# Patient Record
Sex: FEMALE | Race: WHITE | URBAN - METROPOLITAN AREA
[De-identification: names, ages, dates, MRNs, and addresses within clinical notes are randomized per-mention and may not be internally consistent; named-entity substitution may affect disease eponyms.]

---

## 2018-08-04 ENCOUNTER — NURSE TRIAGE (OUTPATIENT)
Dept: NURSING | Facility: CLINIC | Age: 43
End: 2018-08-04

## 2018-08-04 NOTE — TELEPHONE ENCOUNTER
Reason for Disposition    [1] Periods with > 6 soaked pads or tampons per day AND [2] last > 7 days    Additional Information    Negative: Shock suspected (e.g., cold/pale/clammy skin, too weak to stand, low BP, rapid pulse)    Negative: Difficult to awaken or acting confused  (e.g., disoriented, slurred speech)    Negative: Passed out (i.e., lost consciousness, collapsed and was not responding)    Negative: Sounds like a life-threatening emergency to the triager    Negative: Followed a genital area injury    Negative: Pregnant > 20 weeks  (5 months or more)    Negative: Pregnant < 20 weeks  (less than 5 months)    Negative: Bleeding occurring > 12 months after menopause    Negative: Bleeding from sexual abuse or rape    Negative: [1] Vaginal discharge is main symptom AND [2] small amount of blood    Negative: SEVERE abdominal pain    Negative: SEVERE dizziness (e.g., unable to stand, requires support to walk, feels like passing out now)    Negative: SEVERE vaginal bleeding (i.e., soaking 2 pads or tampons per hour and present 2 or more hours)    Negative: Patient sounds very sick or weak to the triager    Negative: MODERATE vaginal bleeding (i.e., soaking 1 pad or tampon per hour and present > 6 hours)    Negative: [1] Constant abdominal pain AND [2] present > 2 hours    Negative: Pale skin (pallor) of new onset or worsening    Negative: Passed tissue (e.g., gray-white)    Negative: Taking Coumadin (warfarin) or other strong blood thinner, or known bleeding disorder (e.g., thrombocytopenia)    Negative: [1] Skin bruises or nosebleed AND [2] not caused by an injury    Protocols used: VAGINAL BLEEDING - ABNORMAL-ADULT-    Carter calls and says that she wants to get an appointment to see Dr. Riley. Pt. Says that she just got her period again, after having her period-2 weeks ago. Pt. Says that she always has bad periods, too. RN then called  , to call pt. Back, at: [phone], for assistance in  scheduling the appointment.

## 2018-08-07 ENCOUNTER — RADIANT APPOINTMENT (OUTPATIENT)
Dept: ULTRASOUND IMAGING | Facility: CLINIC | Age: 43
End: 2018-08-07
Attending: OBSTETRICS & GYNECOLOGY
Payer: COMMERCIAL

## 2018-08-07 ENCOUNTER — OFFICE VISIT (OUTPATIENT)
Dept: OBGYN | Facility: CLINIC | Age: 43
End: 2018-08-07
Payer: COMMERCIAL

## 2018-08-07 VITALS
HEART RATE: 66 BPM | HEIGHT: 65 IN | WEIGHT: 181 LBS | BODY MASS INDEX: 30.16 KG/M2 | SYSTOLIC BLOOD PRESSURE: 124 MMHG | DIASTOLIC BLOOD PRESSURE: 72 MMHG

## 2018-08-07 DIAGNOSIS — N80.03 ADENOMYOSIS: ICD-10-CM

## 2018-08-07 DIAGNOSIS — N92.0 MENORRHAGIA WITH REGULAR CYCLE: Primary | ICD-10-CM

## 2018-08-07 DIAGNOSIS — N92.0 MENORRHAGIA WITH REGULAR CYCLE: ICD-10-CM

## 2018-08-07 PROCEDURE — 99204 OFFICE O/P NEW MOD 45 MIN: CPT | Performed by: OBSTETRICS & GYNECOLOGY

## 2018-08-07 RX ORDER — SPIRONOLACTONE 100 MG/1
100 TABLET, FILM COATED ORAL AT BEDTIME
COMMUNITY
Start: 2018-06-01

## 2018-08-07 RX ORDER — ACYCLOVIR 400 MG/1
400 TABLET ORAL DAILY PRN
COMMUNITY
Start: 2018-06-01

## 2018-08-07 RX ORDER — NORGESTIMATE AND ETHINYL ESTRADIOL 0.25-0.035
1 KIT ORAL AT BEDTIME
COMMUNITY

## 2018-08-07 ASSESSMENT — ANXIETY QUESTIONNAIRES
GAD7 TOTAL SCORE: 3
6. BECOMING EASILY ANNOYED OR IRRITABLE: SEVERAL DAYS
3. WORRYING TOO MUCH ABOUT DIFFERENT THINGS: SEVERAL DAYS
2. NOT BEING ABLE TO STOP OR CONTROL WORRYING: NOT AT ALL
IF YOU CHECKED OFF ANY PROBLEMS ON THIS QUESTIONNAIRE, HOW DIFFICULT HAVE THESE PROBLEMS MADE IT FOR YOU TO DO YOUR WORK, TAKE CARE OF THINGS AT HOME, OR GET ALONG WITH OTHER PEOPLE: SOMEWHAT DIFFICULT
1. FEELING NERVOUS, ANXIOUS, OR ON EDGE: NOT AT ALL
7. FEELING AFRAID AS IF SOMETHING AWFUL MIGHT HAPPEN: NOT AT ALL
5. BEING SO RESTLESS THAT IT IS HARD TO SIT STILL: NOT AT ALL

## 2018-08-07 ASSESSMENT — PATIENT HEALTH QUESTIONNAIRE - PHQ9: 5. POOR APPETITE OR OVEREATING: SEVERAL DAYS

## 2018-08-07 NOTE — NURSING NOTE
Please abstract the following data from this visit with this patient into the appropriate field in Epic:    Pap smear & HPV done on this date: 12/13/16 (approximately), by this group: Fran, results were wnl, HPV-.

## 2018-08-07 NOTE — MR AVS SNAPSHOT
"              After Visit Summary   2018    Carter Stacy    MRN: [id number]           Patient Information     Date Of Birth          1975        Visit Information        Provider Department      2018 11:15 AM Sam Riley MD [facility]        Today's Diagnoses     Menorrhagia with regular cycle    -  1    Adenomyosis           Follow-ups after your visit        Who to contact     If you have questions or need follow up information about today's clinic visit or your schedule please contact [facility] directly at [phone].  Normal or non-critical lab and imaging results will be communicated to you by [employer], letter or phone within 4 business days after the clinic has received the results. If you do not hear from us within 7 days, please contact the clinic through [employer] or phone. If you have a critical or abnormal lab result, we will notify you by phone as soon as possible.  Submit refill requests through [facility] or call your pharmacy and they will forward the refill request to us. Please allow 3 business days for your refill to be completed.          Additional Information About Your Visit        MyChart Information     [facility] lets you send messages to your doctor, view your test results, renew your prescriptions, schedule appointments and more. To sign up, go to [email or web address] . Click on \"Log in\" on the left side of the screen, which will take you to the Welcome page. Then click on \"Sign up Now\" on the right side of the page.     You will be asked to enter the access code listed below, as well as some personal information. Please follow the directions to create your username and password.     Your access code is: [email or web address]  Expires: 2018 11:09 AM     Your access code will  in 90 days. If you need help or a new code, please call your [facility] or [phone].        Care EveryWhere ID     This is your Care EveryWhere ID. " "This could be used by other organizations to access your [facility] medical records  [id number]        Your Vitals Were     Pulse Height Last Period BMI (Body Mass Index)          66 5' 5\" (1.651 m) 08/03/2018 (Exact Date) 30.12 kg/m2         Blood Pressure from Last 3 Encounters:   08/07/18 124/72    Weight from Last 3 Encounters:   08/07/18 181 lb (82.1 kg)               Primary Care Provider Office Phone # Fax #    [facility] [phone]       [facility] [address] [facility] [address]  [facility] [address]        Equal Access to Services     CYNTHIA CONN : Hadii travis michel Sobahman, waaxda luqadaha, qaybta kaalmada adeelisayada, nhi diaz. So [facility] [phone].    ATENCIÓN: Si habla español, tiene a butcher disposición servicios gratuitos de asistencia lingüística. Llame al [phone].    We comply with applicable federal civil rights laws and [place] laws. We do not discriminate on the basis of race, color, national origin, age, disability, sex, sexual orientation, or gender identity.            Thank you!     Thank you for choosing [facility]  for your care. Our goal is always to provide you with excellent care. Hearing back from our patients is one way we can continue to improve our services. Please take a few minutes to complete the written survey that you may receive in the mail after your visit with us. Thank you!             Your Updated Medication List - Protect others around you: Learn how to safely use, store and throw away your medicines at www.disposemymeds.org.          This list is accurate as of 8/7/18  1:16 PM.  Always use your most recent med list.                   Brand Name Dispense Instructions for use Diagnosis    acyclovir 400 MG tablet    ZOVIRAX          norgestimate-ethinyl estradiol 0.25-35 MG-MCG per tablet    ORTHO-CYCLEN, SPRINTEC          spironolactone 100 MG tablet    " ALDACTONE

## 2018-08-07 NOTE — PROGRESS NOTES
SUBJECTIVE:                                                   Carter Stacy is a 42 year old female who presents to clinic today for the following health issue(s):  Patient presents with:  Abnormal Bleeding Problem: c/o heavy periods      HPI: The patient is a 42-year-old  2 who is seen at this time for evaluation of progressive dysmenorrhea and menorrhagia.  She had 2 normal vaginal deliveries and has had multiple attempts at suppression of heavy bleeding with birth control pills.  She has had one abnormal Pap smear with a colposcopy that was unremarkable.  She has not had any recent ultrasound to look into why she is bleeding.      Patient's last menstrual period was 2018 (exact date)..   Patient is sexually active, No obstetric history on file..  Using oral contraceptives for contraception.    reports that she has quit smoking. She has never used smokeless tobacco.    STD testing offered?  Declined    Health maintenance updated:  yes    Today's PHQ-9 Score:   PHQ-9 SCORE 2018   Total Score 7     Today's TOBY-7 Score:   TOBY-7 SCORE 2018   Total Score 3       Problem list and histories reviewed & adjusted, as indicated.  Additional history: as documented.    There is no problem list on file for this patient.    Past Surgical History:   Procedure Laterality Date     ------------OTHER-------------      Exploratory for endo      Social History   Substance Use Topics     Smoking status: Former Smoker     Smokeless tobacco: Never Used     Alcohol use Yes      Problem (# of Occurrences) Relation (Name,Age of Onset)    Colon Cancer (1) Paternal Uncle    Diabetes (2) Maternal Uncle, Maternal Uncle    Hyperlipidemia (1) Father (45)    Hypertension (1) Father (45)            Current Outpatient Prescriptions   Medication Sig     acyclovir (ZOVIRAX) 400 MG tablet      norgestimate-ethinyl estradiol (ORTHO-CYCLEN, SPRINTEC) 0.25-35 MG-MCG per tablet      spironolactone (ALDACTONE) 100 MG tablet   "    No current facility-administered medications for this visit.      Allergies   Allergen Reactions     Sertraline Rash       ROS:  12 point review of systems negative other than symptoms noted below.  Constitutional: Fatigue and Weight Gain  Gastrointestinal: Bloating  Genitourinary: Cramps, Frequency, Heavy Bleeding with Period, Irregular Menses, Significant PMS and Spotting  Skin: Acne  Neurologic: Headaches  Endocrine: Excess Hair Growth  Psychiatric: Difficulty Sleeping and Corral    OBJECTIVE:     /72  Pulse 66  Ht 5' 5\" (1.651 m)  Wt 181 lb (82.1 kg)  LMP 08/03/2018 (Exact Date)  BMI 30.12 kg/m2  Body mass index is 30.12 kg/(m^2).    Exam:  Constitutional:  Appearance: Well nourished, well developed alert, in no acute distress  Neck:  Lymph Nodes:  No lymphadenopathy present; Thyroid:  Gland size normal, nontender, no nodules or masses present on palpation  Chest:  Respiratory Effort:  Breathing unlabored  Cardiovascular: Heart: Auscultation:  Regular rate, normal rhythm, no murmurs present  Gastrointestinal:  Abdominal Examination:  Abdomen nontender to palpation, tone normal without rigidity or guarding, no masses present, umbilicus without lesions; Liver/Spleen:  No hepatomegaly present, liver nontender to palpation; Hernias:  No hernias present  Lymphatic: Lymph Nodes:  No other lymphadenopathy present  Skin:General Inspection:  No rashes present, no lesions present, no areas of discoloration; Genitalia and Groin:  No rashes present, no lesions present, no areas of discoloration, no masses present.  Neurologic/Psychiatric:  Mental Status:  Oriented X3   Pelvic Exam:  External Genitalia:     Normal appearance for age, no discharge present, no tenderness present, no inflammatory lesions present, color normal  Vagina:     Normal vaginal vault without central or paravaginal defects, no discharge present, no inflammatory lesions present, no masses present  Bladder:     Nontender to " palpation  Urethra:   Urethral Body:  Urethra palpation normal, urethra structural support normal   Urethral Meatus:  No erythema or lesions present  Cervix:     Appearance healthy, no lesions present, nontender to palpation, no bleeding present  Uterus:     Uterus: firm, normal sized and nontender, midplane in position.   Adnexa:     No adnexal tenderness present, no adnexal masses present  Perineum:     Perineum within normal limits, no evidence of trauma, no rashes or skin lesions present  Anus:     Anus within normal limits, no hemorrhoids present  Inguinal Lymph Nodes:     No lymphadenopathy present  Pubic Hair:     Normal pubic hair distribution for age  Genitalia and Groin:     No rashes present, no lesions present, no areas of discoloration, no masses present       In-Clinic Test Results: Ultrasound today shows adenomyosis.       ASSESSMENT/PLAN:                                                          42-year-old patient with progressive dysmenorrhea and menorrhagia.  Her plate is complicated by cyclic headaches that she believes are hormonal.  She is on birth control pills at this time.  Her exam shows a boggy tender uterus and ultrasound shows adenomyosis.  The patient is given the option of hysteroscopy D&C and ablation versus laparoscopic assisted supracervical hysterectomy.  The risks and complications of both as well as success and failure and side effects were discussed and compared.  The patient will decide and schedule at her own disposition.        Sam Riley MD  [facility] [employer]

## 2018-08-08 ASSESSMENT — PATIENT HEALTH QUESTIONNAIRE - PHQ9: SUM OF ALL RESPONSES TO PHQ QUESTIONS 1-9: 7

## 2018-08-08 ASSESSMENT — ANXIETY QUESTIONNAIRES: GAD7 TOTAL SCORE: 3

## 2018-08-09 ENCOUNTER — TELEPHONE (OUTPATIENT)
Dept: OBGYN | Facility: CLINIC | Age: 43
End: 2018-08-09

## 2018-08-09 DIAGNOSIS — N80.03 ADENOMYOSIS: Primary | ICD-10-CM

## 2018-08-09 NOTE — TELEPHONE ENCOUNTER
Order Questions      Question Answer Comment     Procedure name(s) - multi select Laparoscopic-assisted supracervical hysterectomy, bilateral salpingectomy      Is this a multi surgeon case? No      Laterality N/A      Reason for procedure Adenomyosis      Location of Case: [facility] OR      Surgeon Procedure Time (incision to closure) in minutes (per procedure as applicable) 60      Note:  Surgical Case Time Needed (in minutes)     Patient Class (for admit prior to surgery, specify number of days in comments): Same day (hospital outpatient)      Why can t this outpatient surgery be done at the [facility] or [facility]? -      Anesthesia General      Vendor Needed? No

## 2018-08-09 NOTE — LETTER
August 16, 2018    Carter Stacy                                                                                                                     [address]  [facility] [address]    Dear Carter,    We have made effort to obtain an accurate quote from your insurance company based on the information we have on file. Your actual coverage may differ. [facility] can NOT guarantee coverage. We recommend that if you have questions regarding coverage to call your insurance company. Our billing department is happy to assist you with your insurance claim but you are responsible for all services rendered whether or not they are paid by your insurance provider. Again, this is not a guarantee of benefits just a notice of the information they have given to us.       Insurance Co [employer] Phone # [phone]  ID [id number]         Group [id number]  Gay roach/ Roxana on 8/16/2018    Policy Eff Date 1/1/2016 and current Yes  CoInsurance (covered at) 80% after deductible has been met and 100% after MOOP is met  Deductible $5700 met to date $3201.43  Max Out Of Pocket (MOOP) $9500 met to date $3205.90  CoPay $NA  Prior Auth Required:  No  Pre Existing Condition No  Surgeon E Rosemary In Network Yes  Hospital [facility] In Network Yes  Referral Needed:  No.  Mailed to Patient Yes                  If No Document why by date                  If Yes                                   Date 8/16/2018      Sincerely,         Gay Ling  Surgery Scheduler

## 2018-08-09 NOTE — TELEPHONE ENCOUNTER
Type of surgery: LASH BS  Location of surgery: [facility] OR  Date and time of surgery: 9/4/2018 7:20am ARRIVAL 5:30am  Surgeon: Rosemary  Pre-Op Appt Date: 8/7/2018  Post-Op Appt Date: TBD   Packet sent out: MAILED 8/9/2018MAILED 8/9/2018  Pre-cert/Authorization completed:  TBD  Date: 8/9/2018 Madan roach/Cecy Ling  Surgery Scheduler

## 2018-08-16 NOTE — TELEPHONE ENCOUNTER
Insurance Co [employer] Phone # [phone]  ID [id number]         Group [id number]  Gay garay w/ Roxana on 8/16/2018    Policy Eff Date 1/1/2016 and current Yes  CoInsurance (covered at) 80% after deductible has been met and 100% after MOOP is met  Deductible $5700 met to date $3201.43  Max Out Of Pocket (MOOP) $9500 met to date $3205.90  CoPay $NA  Prior Auth Required:  No  Pre Existing Condition No  Surgeon E Rosemary In Network Yes  Hospital [facility] In Network Yes  Referral Needed:  No.  Mailed to Patient Yes                  If No Document why by date                  If Yes                                   Date 8/16/2018    Gay Ling  Surgery Scheduler

## 2018-08-29 NOTE — H&P (VIEW-ONLY)
SUBJECTIVE:                                                   Carter Stacy is a 42 year old female who presents to clinic today for the following health issue(s):  Patient presents with:  Abnormal Bleeding Problem: c/o heavy periods      HPI: The patient is a 42-year-old  2 who is seen at this time for evaluation of progressive dysmenorrhea and menorrhagia.  She had 2 normal vaginal deliveries and has had multiple attempts at suppression of heavy bleeding with birth control pills.  She has had one abnormal Pap smear with a colposcopy that was unremarkable.  She has not had any recent ultrasound to look into why she is bleeding.      Patient's last menstrual period was 2018 (exact date)..   Patient is sexually active, No obstetric history on file..  Using oral contraceptives for contraception.    reports that she has quit smoking. She has never used smokeless tobacco.    STD testing offered?  Declined    Health maintenance updated:  yes    Today's PHQ-9 Score:   PHQ-9 SCORE 2018   Total Score 7     Today's TOBY-7 Score:   TOBY-7 SCORE 2018   Total Score 3       Problem list and histories reviewed & adjusted, as indicated.  Additional history: as documented.    There is no problem list on file for this patient.    Past Surgical History:   Procedure Laterality Date     ------------OTHER-------------      Exploratory for endo      Social History   Substance Use Topics     Smoking status: Former Smoker     Smokeless tobacco: Never Used     Alcohol use Yes      Problem (# of Occurrences) Relation (Name,Age of Onset)    Colon Cancer (1) Paternal Uncle    Diabetes (2) Maternal Uncle, Maternal Uncle    Hyperlipidemia (1) Father (45)    Hypertension (1) Father (45)            Current Outpatient Prescriptions   Medication Sig     acyclovir (ZOVIRAX) 400 MG tablet      norgestimate-ethinyl estradiol (ORTHO-CYCLEN, SPRINTEC) 0.25-35 MG-MCG per tablet      spironolactone (ALDACTONE) 100 MG tablet   "    No current facility-administered medications for this visit.      Allergies   Allergen Reactions     Sertraline Rash       ROS:  12 point review of systems negative other than symptoms noted below.  Constitutional: Fatigue and Weight Gain  Gastrointestinal: Bloating  Genitourinary: Cramps, Frequency, Heavy Bleeding with Period, Irregular Menses, Significant PMS and Spotting  Skin: Acne  Neurologic: Headaches  Endocrine: Excess Hair Growth  Psychiatric: Difficulty Sleeping and Corral    OBJECTIVE:     /72  Pulse 66  Ht 5' 5\" (1.651 m)  Wt 181 lb (82.1 kg)  LMP 08/03/2018 (Exact Date)  BMI 30.12 kg/m2  Body mass index is 30.12 kg/(m^2).    Exam:  Constitutional:  Appearance: Well nourished, well developed alert, in no acute distress  Neck:  Lymph Nodes:  No lymphadenopathy present; Thyroid:  Gland size normal, nontender, no nodules or masses present on palpation  Chest:  Respiratory Effort:  Breathing unlabored  Cardiovascular: Heart: Auscultation:  Regular rate, normal rhythm, no murmurs present  Gastrointestinal:  Abdominal Examination:  Abdomen nontender to palpation, tone normal without rigidity or guarding, no masses present, umbilicus without lesions; Liver/Spleen:  No hepatomegaly present, liver nontender to palpation; Hernias:  No hernias present  Lymphatic: Lymph Nodes:  No other lymphadenopathy present  Skin:General Inspection:  No rashes present, no lesions present, no areas of discoloration; Genitalia and Groin:  No rashes present, no lesions present, no areas of discoloration, no masses present.  Neurologic/Psychiatric:  Mental Status:  Oriented X3   Pelvic Exam:  External Genitalia:     Normal appearance for age, no discharge present, no tenderness present, no inflammatory lesions present, color normal  Vagina:     Normal vaginal vault without central or paravaginal defects, no discharge present, no inflammatory lesions present, no masses present  Bladder:     Nontender to " palpation  Urethra:   Urethral Body:  Urethra palpation normal, urethra structural support normal   Urethral Meatus:  No erythema or lesions present  Cervix:     Appearance healthy, no lesions present, nontender to palpation, no bleeding present  Uterus:     Uterus: firm, normal sized and nontender, midplane in position.   Adnexa:     No adnexal tenderness present, no adnexal masses present  Perineum:     Perineum within normal limits, no evidence of trauma, no rashes or skin lesions present  Anus:     Anus within normal limits, no hemorrhoids present  Inguinal Lymph Nodes:     No lymphadenopathy present  Pubic Hair:     Normal pubic hair distribution for age  Genitalia and Groin:     No rashes present, no lesions present, no areas of discoloration, no masses present       In-Clinic Test Results: Ultrasound today shows adenomyosis.       ASSESSMENT/PLAN:                                                          42-year-old patient with progressive dysmenorrhea and menorrhagia.  Her plate is complicated by cyclic headaches that she believes are hormonal.  She is on birth control pills at this time.  Her exam shows a boggy tender uterus and ultrasound shows adenomyosis.  The patient is given the option of hysteroscopy D&C and ablation versus laparoscopic assisted supracervical hysterectomy.  The risks and complications of both as well as success and failure and side effects were discussed and compared.  The patient will decide and schedule at her own disposition.        Sam Riley MD  [facility] [place]

## 2018-09-03 ENCOUNTER — ANESTHESIA EVENT (OUTPATIENT)
Dept: SURGERY | Facility: CLINIC | Age: 43
End: 2018-09-03
Payer: COMMERCIAL

## 2018-09-04 ENCOUNTER — SURGERY (OUTPATIENT)
Age: 43
End: 2018-09-04
Payer: COMMERCIAL

## 2018-09-04 ENCOUNTER — HOSPITAL ENCOUNTER (OUTPATIENT)
Facility: CLINIC | Age: 43
Discharge: HOME | End: 2018-09-05
Attending: OBSTETRICS & GYNECOLOGY | Admitting: OBSTETRICS & GYNECOLOGY
Payer: COMMERCIAL

## 2018-09-04 ENCOUNTER — ANESTHESIA (OUTPATIENT)
Dept: SURGERY | Facility: CLINIC | Age: 43
End: 2018-09-04
Payer: COMMERCIAL

## 2018-09-04 DIAGNOSIS — N80.03 ADENOMYOSIS: Primary | ICD-10-CM

## 2018-09-04 LAB — B-HCG SERPL-ACNC: <1 IU/L (ref 0–5)

## 2018-09-04 PROCEDURE — 40000936 ZZH STATISTIC OUTPATIENT (NON-OBS) NIGHT

## 2018-09-04 PROCEDURE — 27210794 ZZH OR GENERAL SUPPLY STERILE: Performed by: OBSTETRICS & GYNECOLOGY

## 2018-09-04 PROCEDURE — 84702 CHORIONIC GONADOTROPIN TEST: CPT | Performed by: OBSTETRICS & GYNECOLOGY

## 2018-09-04 PROCEDURE — 36415 COLL VENOUS BLD VENIPUNCTURE: CPT | Performed by: OBSTETRICS & GYNECOLOGY

## 2018-09-04 PROCEDURE — 25000125 ZZHC RX 250: Performed by: OBSTETRICS & GYNECOLOGY

## 2018-09-04 PROCEDURE — 25800025 ZZH RX 258: Performed by: OBSTETRICS & GYNECOLOGY

## 2018-09-04 PROCEDURE — 25000566 ZZH SEVOFLURANE, EA 15 MIN: Performed by: OBSTETRICS & GYNECOLOGY

## 2018-09-04 PROCEDURE — 88307 TISSUE EXAM BY PATHOLOGIST: CPT | Mod: 26 | Performed by: OBSTETRICS & GYNECOLOGY

## 2018-09-04 PROCEDURE — 36000093 ZZH SURGERY LEVEL 4 1ST 30 MIN: Performed by: OBSTETRICS & GYNECOLOGY

## 2018-09-04 PROCEDURE — 71000012 ZZH RECOVERY PHASE 1 LEVEL 1 FIRST HR: Performed by: OBSTETRICS & GYNECOLOGY

## 2018-09-04 PROCEDURE — 25000125 ZZHC RX 250: Performed by: NURSE ANESTHETIST, CERTIFIED REGISTERED

## 2018-09-04 PROCEDURE — 58542 LSH W/T/O UT 250 G OR LESS: CPT | Performed by: OBSTETRICS & GYNECOLOGY

## 2018-09-04 PROCEDURE — 25000128 H RX IP 250 OP 636: Performed by: NURSE ANESTHETIST, CERTIFIED REGISTERED

## 2018-09-04 PROCEDURE — 25000128 H RX IP 250 OP 636: Performed by: OBSTETRICS & GYNECOLOGY

## 2018-09-04 PROCEDURE — 40000170 ZZH STATISTIC PRE-PROCEDURE ASSESSMENT II: Performed by: OBSTETRICS & GYNECOLOGY

## 2018-09-04 PROCEDURE — 37000009 ZZH ANESTHESIA TECHNICAL FEE, EACH ADDTL 15 MIN: Performed by: OBSTETRICS & GYNECOLOGY

## 2018-09-04 PROCEDURE — 25000125 ZZHC RX 250: Performed by: ANESTHESIOLOGY

## 2018-09-04 PROCEDURE — 36000063 ZZH SURGERY LEVEL 4 EA 15 ADDTL MIN: Performed by: OBSTETRICS & GYNECOLOGY

## 2018-09-04 PROCEDURE — 40000934 ZZH STATISTIC OUTPATIENT (NON-OBS) DAY

## 2018-09-04 PROCEDURE — 37000008 ZZH ANESTHESIA TECHNICAL FEE, 1ST 30 MIN: Performed by: OBSTETRICS & GYNECOLOGY

## 2018-09-04 PROCEDURE — 88307 TISSUE EXAM BY PATHOLOGIST: CPT | Performed by: OBSTETRICS & GYNECOLOGY

## 2018-09-04 PROCEDURE — 25000132 ZZH RX MED GY IP 250 OP 250 PS 637: Performed by: OBSTETRICS & GYNECOLOGY

## 2018-09-04 PROCEDURE — 25000128 H RX IP 250 OP 636: Performed by: ANESTHESIOLOGY

## 2018-09-04 PROCEDURE — 40000935 ZZH STATISTIC OUTPATIENT (NON-OBS) EVE

## 2018-09-04 RX ORDER — PROPOFOL 10 MG/ML
INJECTION, EMULSION INTRAVENOUS CONTINUOUS PRN
Status: DISCONTINUED | OUTPATIENT
Start: 2018-09-04 | End: 2018-09-04

## 2018-09-04 RX ORDER — MEPERIDINE HYDROCHLORIDE 25 MG/ML
12.5 INJECTION INTRAMUSCULAR; INTRAVENOUS; SUBCUTANEOUS
Status: DISCONTINUED | OUTPATIENT
Start: 2018-09-04 | End: 2018-09-04

## 2018-09-04 RX ORDER — ONDANSETRON 2 MG/ML
4 INJECTION INTRAMUSCULAR; INTRAVENOUS EVERY 30 MIN PRN
Status: DISCONTINUED | OUTPATIENT
Start: 2018-09-04 | End: 2018-09-04

## 2018-09-04 RX ORDER — SODIUM CHLORIDE, SODIUM LACTATE, POTASSIUM CHLORIDE, CALCIUM CHLORIDE 600; 310; 30; 20 MG/100ML; MG/100ML; MG/100ML; MG/100ML
INJECTION, SOLUTION INTRAVENOUS CONTINUOUS
Status: DISCONTINUED | OUTPATIENT
Start: 2018-09-04 | End: 2018-09-04

## 2018-09-04 RX ORDER — SCOLOPAMINE TRANSDERMAL SYSTEM 1 MG/1
1 PATCH, EXTENDED RELEASE TRANSDERMAL
Status: DISCONTINUED | OUTPATIENT
Start: 2018-09-04 | End: 2018-09-04 | Stop reason: HOSPADM

## 2018-09-04 RX ORDER — NALOXONE HYDROCHLORIDE 0.4 MG/ML
.1-.4 INJECTION, SOLUTION INTRAMUSCULAR; INTRAVENOUS; SUBCUTANEOUS
Status: DISCONTINUED | OUTPATIENT
Start: 2018-09-04 | End: 2018-09-04

## 2018-09-04 RX ORDER — ACETAMINOPHEN 650 MG/1
650 SUPPOSITORY RECTAL EVERY 4 HOURS PRN
Status: DISCONTINUED | OUTPATIENT
Start: 2018-09-04 | End: 2018-09-04

## 2018-09-04 RX ORDER — FENTANYL CITRATE 50 UG/ML
25-50 INJECTION, SOLUTION INTRAMUSCULAR; INTRAVENOUS
Status: CANCELLED | OUTPATIENT
Start: 2018-09-04

## 2018-09-04 RX ORDER — LIDOCAINE 40 MG/G
CREAM TOPICAL
Status: DISCONTINUED | OUTPATIENT
Start: 2018-09-04 | End: 2018-09-05 | Stop reason: HOSPADM

## 2018-09-04 RX ORDER — HYDROMORPHONE HYDROCHLORIDE 1 MG/ML
0.2 INJECTION, SOLUTION INTRAMUSCULAR; INTRAVENOUS; SUBCUTANEOUS
Status: DISCONTINUED | OUTPATIENT
Start: 2018-09-04 | End: 2018-09-05 | Stop reason: HOSPADM

## 2018-09-04 RX ORDER — NALOXONE HYDROCHLORIDE 0.4 MG/ML
.1-.4 INJECTION, SOLUTION INTRAMUSCULAR; INTRAVENOUS; SUBCUTANEOUS
Status: DISCONTINUED | OUTPATIENT
Start: 2018-09-04 | End: 2018-09-05 | Stop reason: HOSPADM

## 2018-09-04 RX ORDER — OXYCODONE AND ACETAMINOPHEN 5; 325 MG/1; MG/1
1-2 TABLET ORAL EVERY 4 HOURS PRN
Qty: 36 TABLET | Refills: 0 | Status: SHIPPED | OUTPATIENT
Start: 2018-09-04

## 2018-09-04 RX ORDER — CEFAZOLIN SODIUM 2 G/100ML
2 INJECTION, SOLUTION INTRAVENOUS EVERY 8 HOURS
Status: COMPLETED | OUTPATIENT
Start: 2018-09-04 | End: 2018-09-05

## 2018-09-04 RX ORDER — NEOSTIGMINE METHYLSULFATE 1 MG/ML
VIAL (ML) INJECTION PRN
Status: DISCONTINUED | OUTPATIENT
Start: 2018-09-04 | End: 2018-09-04

## 2018-09-04 RX ORDER — ONDANSETRON 4 MG/1
4 TABLET, ORALLY DISINTEGRATING ORAL EVERY 30 MIN PRN
Status: DISCONTINUED | OUTPATIENT
Start: 2018-09-04 | End: 2018-09-04

## 2018-09-04 RX ORDER — ONDANSETRON 2 MG/ML
INJECTION INTRAMUSCULAR; INTRAVENOUS PRN
Status: DISCONTINUED | OUTPATIENT
Start: 2018-09-04 | End: 2018-09-04

## 2018-09-04 RX ORDER — SODIUM CHLORIDE, SODIUM LACTATE, POTASSIUM CHLORIDE, CALCIUM CHLORIDE 600; 310; 30; 20 MG/100ML; MG/100ML; MG/100ML; MG/100ML
INJECTION, SOLUTION INTRAVENOUS CONTINUOUS PRN
Status: DISCONTINUED | OUTPATIENT
Start: 2018-09-04 | End: 2018-09-04

## 2018-09-04 RX ORDER — HYDROMORPHONE HYDROCHLORIDE 1 MG/ML
.3-.5 INJECTION, SOLUTION INTRAMUSCULAR; INTRAVENOUS; SUBCUTANEOUS EVERY 10 MIN PRN
Status: DISCONTINUED | OUTPATIENT
Start: 2018-09-04 | End: 2018-09-04

## 2018-09-04 RX ORDER — ONDANSETRON 2 MG/ML
4 INJECTION INTRAMUSCULAR; INTRAVENOUS EVERY 6 HOURS PRN
Status: DISCONTINUED | OUTPATIENT
Start: 2018-09-04 | End: 2018-09-05 | Stop reason: HOSPADM

## 2018-09-04 RX ORDER — FENTANYL CITRATE 50 UG/ML
INJECTION, SOLUTION INTRAMUSCULAR; INTRAVENOUS PRN
Status: DISCONTINUED | OUTPATIENT
Start: 2018-09-04 | End: 2018-09-04

## 2018-09-04 RX ORDER — CEFAZOLIN SODIUM 1 G/3ML
1 INJECTION, POWDER, FOR SOLUTION INTRAMUSCULAR; INTRAVENOUS SEE ADMIN INSTRUCTIONS
Status: DISCONTINUED | OUTPATIENT
Start: 2018-09-04 | End: 2018-09-04 | Stop reason: HOSPADM

## 2018-09-04 RX ORDER — OXYCODONE AND ACETAMINOPHEN 5; 325 MG/1; MG/1
1-2 TABLET ORAL EVERY 4 HOURS PRN
Status: DISCONTINUED | OUTPATIENT
Start: 2018-09-04 | End: 2018-09-05 | Stop reason: HOSPADM

## 2018-09-04 RX ORDER — HEPARIN SODIUM 1000 [USP'U]/ML
INJECTION, SOLUTION INTRAVENOUS; SUBCUTANEOUS
Status: DISCONTINUED
Start: 2018-09-04 | End: 2018-09-04 | Stop reason: HOSPADM

## 2018-09-04 RX ORDER — DEXAMETHASONE SODIUM PHOSPHATE 4 MG/ML
INJECTION, SOLUTION INTRA-ARTICULAR; INTRALESIONAL; INTRAMUSCULAR; INTRAVENOUS; SOFT TISSUE PRN
Status: DISCONTINUED | OUTPATIENT
Start: 2018-09-04 | End: 2018-09-04

## 2018-09-04 RX ORDER — PROCHLORPERAZINE MALEATE 5 MG
10 TABLET ORAL EVERY 6 HOURS PRN
Status: DISCONTINUED | OUTPATIENT
Start: 2018-09-04 | End: 2018-09-05 | Stop reason: HOSPADM

## 2018-09-04 RX ORDER — EPHEDRINE SULFATE 50 MG/ML
INJECTION, SOLUTION INTRAMUSCULAR; INTRAVENOUS; SUBCUTANEOUS PRN
Status: DISCONTINUED | OUTPATIENT
Start: 2018-09-04 | End: 2018-09-04

## 2018-09-04 RX ORDER — LIDOCAINE HYDROCHLORIDE 20 MG/ML
INJECTION, SOLUTION INFILTRATION; PERINEURAL PRN
Status: DISCONTINUED | OUTPATIENT
Start: 2018-09-04 | End: 2018-09-04

## 2018-09-04 RX ORDER — CEFAZOLIN SODIUM 2 G/100ML
2 INJECTION, SOLUTION INTRAVENOUS
Status: COMPLETED | OUTPATIENT
Start: 2018-09-04 | End: 2018-09-04

## 2018-09-04 RX ORDER — GLYCOPYRROLATE 0.2 MG/ML
INJECTION, SOLUTION INTRAMUSCULAR; INTRAVENOUS PRN
Status: DISCONTINUED | OUTPATIENT
Start: 2018-09-04 | End: 2018-09-04

## 2018-09-04 RX ORDER — PROPOFOL 10 MG/ML
INJECTION, EMULSION INTRAVENOUS PRN
Status: DISCONTINUED | OUTPATIENT
Start: 2018-09-04 | End: 2018-09-04

## 2018-09-04 RX ORDER — ONDANSETRON 4 MG/1
4 TABLET, ORALLY DISINTEGRATING ORAL EVERY 6 HOURS PRN
Status: DISCONTINUED | OUTPATIENT
Start: 2018-09-04 | End: 2018-09-05 | Stop reason: HOSPADM

## 2018-09-04 RX ORDER — BUPIVACAINE HYDROCHLORIDE 2.5 MG/ML
INJECTION, SOLUTION INFILTRATION; PERINEURAL PRN
Status: DISCONTINUED | OUTPATIENT
Start: 2018-09-04 | End: 2018-09-04 | Stop reason: HOSPADM

## 2018-09-04 RX ORDER — FENTANYL CITRATE 50 UG/ML
25-50 INJECTION, SOLUTION INTRAMUSCULAR; INTRAVENOUS
Status: DISCONTINUED | OUTPATIENT
Start: 2018-09-04 | End: 2018-09-04 | Stop reason: HOSPADM

## 2018-09-04 RX ORDER — BUPIVACAINE HYDROCHLORIDE 2.5 MG/ML
INJECTION, SOLUTION EPIDURAL; INFILTRATION; INTRACAUDAL
Status: DISCONTINUED
Start: 2018-09-04 | End: 2018-09-04 | Stop reason: HOSPADM

## 2018-09-04 RX ORDER — ALBUTEROL SULFATE 0.83 MG/ML
2.5 SOLUTION RESPIRATORY (INHALATION) EVERY 4 HOURS PRN
Status: DISCONTINUED | OUTPATIENT
Start: 2018-09-04 | End: 2018-09-04 | Stop reason: HOSPADM

## 2018-09-04 RX ADMIN — Medication 5 MG: at 07:30

## 2018-09-04 RX ADMIN — SCOPALAMINE 1 PATCH: 1 PATCH, EXTENDED RELEASE TRANSDERMAL at 06:31

## 2018-09-04 RX ADMIN — BUPIVACAINE HYDROCHLORIDE 14 ML: 2.5 INJECTION, SOLUTION EPIDURAL; INFILTRATION; INTRACAUDAL; PERINEURAL at 08:23

## 2018-09-04 RX ADMIN — FENTANYL CITRATE 50 MCG: 50 INJECTION, SOLUTION INTRAMUSCULAR; INTRAVENOUS at 07:36

## 2018-09-04 RX ADMIN — FENTANYL CITRATE 50 MCG: 50 INJECTION, SOLUTION INTRAMUSCULAR; INTRAVENOUS at 07:18

## 2018-09-04 RX ADMIN — Medication 1 LOZENGE: at 11:32

## 2018-09-04 RX ADMIN — NEOSTIGMINE METHYLSULFATE 4 MG: 1 INJECTION, SOLUTION INTRAVENOUS at 08:15

## 2018-09-04 RX ADMIN — CEFAZOLIN SODIUM 2 G: 2 INJECTION, SOLUTION INTRAVENOUS at 23:47

## 2018-09-04 RX ADMIN — ROCURONIUM BROMIDE 30 MG: 10 INJECTION INTRAVENOUS at 07:19

## 2018-09-04 RX ADMIN — DEXAMETHASONE SODIUM PHOSPHATE 4 MG: 4 INJECTION, SOLUTION INTRA-ARTICULAR; INTRALESIONAL; INTRAMUSCULAR; INTRAVENOUS; SOFT TISSUE at 07:36

## 2018-09-04 RX ADMIN — DEXMEDETOMIDINE HYDROCHLORIDE 12 MCG: 100 INJECTION, SOLUTION INTRAVENOUS at 07:48

## 2018-09-04 RX ADMIN — DEXTROSE AND SODIUM CHLORIDE: 5; 450 INJECTION, SOLUTION INTRAVENOUS at 10:43

## 2018-09-04 RX ADMIN — FENTANYL CITRATE 50 MCG: 50 INJECTION, SOLUTION INTRAMUSCULAR; INTRAVENOUS at 09:00

## 2018-09-04 RX ADMIN — PROPOFOL 200 MG: 10 INJECTION, EMULSION INTRAVENOUS at 07:19

## 2018-09-04 RX ADMIN — Medication 0.5 MG: at 09:25

## 2018-09-04 RX ADMIN — HEPARIN SODIUM 1000 ML: 1000 INJECTION, SOLUTION INTRAVENOUS; SUBCUTANEOUS at 07:55

## 2018-09-04 RX ADMIN — MIDAZOLAM 2 MG: 1 INJECTION INTRAMUSCULAR; INTRAVENOUS at 07:17

## 2018-09-04 RX ADMIN — CEFAZOLIN SODIUM 2 G: 2 INJECTION, SOLUTION INTRAVENOUS at 15:18

## 2018-09-04 RX ADMIN — PROPOFOL: 10 INJECTION, EMULSION INTRAVENOUS at 07:58

## 2018-09-04 RX ADMIN — PROPOFOL 150 MCG/KG/MIN: 10 INJECTION, EMULSION INTRAVENOUS at 07:19

## 2018-09-04 RX ADMIN — Medication 0.2 MG: at 11:32

## 2018-09-04 RX ADMIN — ONDANSETRON 4 MG: 2 INJECTION INTRAMUSCULAR; INTRAVENOUS at 08:08

## 2018-09-04 RX ADMIN — Medication 5 MG: at 07:29

## 2018-09-04 RX ADMIN — OXYCODONE HYDROCHLORIDE AND ACETAMINOPHEN 2 TABLET: 5; 325 TABLET ORAL at 19:47

## 2018-09-04 RX ADMIN — SODIUM CHLORIDE, POTASSIUM CHLORIDE, SODIUM LACTATE AND CALCIUM CHLORIDE: 600; 310; 30; 20 INJECTION, SOLUTION INTRAVENOUS at 07:16

## 2018-09-04 RX ADMIN — LIDOCAINE HYDROCHLORIDE 40 MG: 20 INJECTION, SOLUTION INFILTRATION; PERINEURAL at 07:19

## 2018-09-04 RX ADMIN — CEFAZOLIN SODIUM 2 G: 2 INJECTION, SOLUTION INTRAVENOUS at 07:30

## 2018-09-04 RX ADMIN — DEXMEDETOMIDINE HYDROCHLORIDE 8 MCG: 100 INJECTION, SOLUTION INTRAVENOUS at 07:50

## 2018-09-04 RX ADMIN — GLYCOPYRROLATE 0.6 MG: 0.2 INJECTION, SOLUTION INTRAMUSCULAR; INTRAVENOUS at 08:14

## 2018-09-04 RX ADMIN — FENTANYL CITRATE 50 MCG: 50 INJECTION, SOLUTION INTRAMUSCULAR; INTRAVENOUS at 08:50

## 2018-09-04 ASSESSMENT — ENCOUNTER SYMPTOMS: ORTHOPNEA: 0

## 2018-09-04 NOTE — BRIEF OP NOTE
OP NOTE;  Lap assisted supracervical hysterectomy, bilateral salpingectomy     ebl 25 ml    mckinney

## 2018-09-04 NOTE — PLAN OF CARE
A&Ox4, VSS on RA. Capno WDL. C/o 3/10 pain managed with IV dilaudid and ice packs with pain relief. 3 lap Incisions CDI, no vag bleeding present. Haney in place. SBA. Adv diet as john - tolerated ice chips, water, and jello. Bowel sounds active in 2/4 quadrants. IV infusing. Plan to DC 9/5/18.

## 2018-09-04 NOTE — IP AVS SNAPSHOT
[facility]    [address] [place] [id number]    Phone:  [phone]                                       After Visit Summary   9/4/2018    Carter Stacy    MRN: [id number]           After Visit Summary Signature Page     I have received my discharge instructions, and my questions have been answered. I have discussed any challenges I see with this plan with the nurse or doctor.    ..........................................................................................................................................  Patient/Patient Representative Signature      ..........................................................................................................................................  Patient Representative Print Name and Relationship to Patient    ..................................................               ................................................  Date                                            Time    ..........................................................................................................................................  Reviewed by Signature/Title    ...................................................              ..............................................  Date                                                            Time          22EPIC Rev 08/18

## 2018-09-04 NOTE — ANESTHESIA CARE TRANSFER NOTE
Patient: Carter Stacy    Procedure(s):  LAPAROSCOPIC ASSISTED SUPRACERVICAL HYSTERECTOMY, BILATERAL SALPINGECTOMY - Wound Class: II-Clean Contaminated   - Wound Class: II-Clean Contaminated    Diagnosis: adenomyosis  Diagnosis Additional Information: No value filed.    Anesthesia Type:   General, ETT     Note:  Airway :Face Mask  Patient transferred to:PACU  Comments: Pt exhibits spont resps, all monitors and alarms on in pacu, report given to RN, vss.Handoff Report: Identifed the Patient, Identified the Reponsible Provider, Reviewed the pertinent medical history, Discussed the surgical course, Reviewed Intra-OP anesthesia mangement and issues during anesthesia, Set expectations for post-procedure period and Allowed opportunity for questions and acknowledgement of understanding      Vitals: (Last set prior to Anesthesia Care Transfer)    CRNA VITALS  9/4/2018 0759 - 9/4/2018 0834      9/4/2018             Pulse: 100    SpO2: 99 %    Resp Rate (observed): 27                Electronically Signed By: AUSTEN Tineo CRNA  September 4, 2018  8:34 AM

## 2018-09-04 NOTE — ANESTHESIA PREPROCEDURE EVALUATION
Anesthesia Evaluation     . Pt has had prior anesthetic.     History of anesthetic complications   - PONV        ROS/MED HX    ENT/Pulmonary: Comment: TMJ synd     (-) sleep apnea   Neurologic: Comment: Insomnia, HAs      Cardiovascular:     (+) ----. : . CHF . . :. .      (-) BOUCHER, orthopnea/PND, syncope and irregular heartbeat/palpitations   METS/Exercise Tolerance:  >4 METS   Hematologic:         Musculoskeletal:         GI/Hepatic:        (-) GERD   Renal/Genitourinary:         Endo: Comment: Menorrhagia         Psychiatric:     (+) psychiatric history anxiety and depression      Infectious Disease:         Malignancy:         Other:                     Physical Exam      Airway   Mallampati: II  TM distance: >3 FB  Neck ROM: full    Dental   (+) caps    Cardiovascular   Rhythm and rate: regular      Pulmonary    breath sounds clear to auscultation                    Anesthesia Plan      History & Physical Review  History and physical reviewed and following examination; no interval change.    ASA Status:  2 .        Plan for General and ETT   PONV prophylaxis:  Ondansetron (or other 5HT-3) and Dexamethasone or Solumedrol  Additional equipment: Videolaryngoscope Glidescope, propofol infusion       Postoperative Care      Consents  Anesthetic plan, risks, benefits and alternatives discussed with:  Patient..                          .  Procedure: Procedure(s):  LAPAROSCOPIC HYSTERECTOMY SUPRACERVICAL  LAPAROSCOPIC SALPINGECTOMY  Preop diagnosis: adenomyosis    Allergies   Allergen Reactions     Sertraline Rash     Past Medical History:   Diagnosis Date     Depression      Genital warts      HPV in female      Past Surgical History:   Procedure Laterality Date     ------------OTHER-------------  2008    Exploratory for endo     GYN SURGERY      Laparoscopy     HC TOOTH EXTRACTION W/FORCEP       Social History   Substance Use Topics     Smoking status: Former Smoker     Smokeless tobacco: Never Used     Alcohol use  Yes      Comment: 1/ week      Prior to Admission medications    Medication Sig Start Date End Date Taking? Authorizing Provider   acyclovir (ZOVIRAX) 400 MG tablet Take 400 mg by mouth daily as needed (Cold sores)  6/1/18  Yes Reported, Patient   Aspirin-Acetaminophen-Caffeine (EXCEDRIN MIGRAINE PO) Take 1-2 tablets by mouth 2 times daily as needed (headaches)   Yes Reported, Patient   Ibuprofen (ADVIL PO) Take 400-600 mg by mouth 2 times daily as needed for moderate pain (Headache)    Yes Reported, Patient   Ketotifen Fumarate (ZADITOR OP) Place 1 drop into both eyes daily as needed   Yes Reported, Patient   norgestimate-ethinyl estradiol (ORTHO-CYCLEN, SPRINTEC) 0.25-35 MG-MCG per tablet Take 1 tablet by mouth At Bedtime    Yes Reported, Patient   OVER-THE-COUNTER Apply 1 patch topically daily as needed (for energy) Apply for 8-10 hours  (B12 Patch)   Yes Reported, Patient   Polyethyl Glycol-Propyl Glycol (SYSTANE OP) Place 1 drop into both eyes At Bedtime   Yes Reported, Patient   spironolactone (ALDACTONE) 100 MG tablet Take 100 mg by mouth At Bedtime  6/1/18  Yes Reported, Patient     Current Facility-Administered Medications Ordered in Epic   Medication Dose Route Frequency Last Rate Last Dose     ceFAZolin (ANCEF) 1 g vial to attach to  ml bag for ADULT or 50 ml bag for PEDS  1 g Intravenous See Admin Instructions         ceFAZolin (ANCEF) intermittent infusion 2 g in 100 mL dextrose PRE-MIX  2 g Intravenous Pre-Op/Pre-procedure x 1 dose         scopolamine (TRANSDERM) 72 hr patch 1 patch  1 patch Transdermal Q72H   1 patch at 09/04/18 0631    And     scopolamine (TRANSDERM-SCOP) Patch in Place   Transdermal Q8H        And     [START ON 9/7/2018] scopolamine (TRANSDERM-SCOP) patch REMOVAL   Transdermal Q72H         No current [facility]-ordered outpatient prescriptions on file.       Wt Readings from Last 1 Encounters:   09/04/18 85.1 kg (187 lb 11.2 oz)     Temp Readings from Last 1 Encounters:   09/04/18  35.7  C (96.2  F) (Oral)     BP Readings from Last 6 Encounters:   09/04/18 126/70   08/07/18 124/72     Pulse Readings from Last 4 Encounters:   09/04/18 71   08/07/18 66     Resp Readings from Last 1 Encounters:   09/04/18 18     SpO2 Readings from Last 1 Encounters:   09/04/18 96%     No results for input(s): NA, POTASSIUM, CHLORIDE, CO2, ANIONGAP, GLC, BUN, CR, RUDY in the last 18557 hours.  No results for input(s): AST, ALT, ALKPHOS, BILITOTAL, LIPASE in the last 29169 hours.  No results for input(s): WBC, HGB, PLT in the last 80669 hours.  No results for input(s): ABO, RH in the last 12908 hours.  No results for input(s): INR, PTT in the last 08525 hours.   No results for input(s): TROPI in the last 52802 hours.  No results for input(s): PH, PCO2, PO2, HCO3 in the last 34810 hours.  No results for input(s): HCG in the last 70238 hours.  Recent Results (from the past 744 hour(s))   US Transvaginal Non OB    Narrative    US Transvaginal Non OB    Order #: [id number] Accession #: [id number]         Study Notes        Jose Bellamy on 8/7/2018 12:47 PM     Gynecological Ultrasound Report  Pelvic U/S - Transvaginal    [facility]  Referring Provider: DR VANN  Sonographer:  JOSE BELLAMY  Indication: Menorrhagia & pelvic pain  LMP: Patient's last menstrual period was 08/03/2018 (exact date).  History:   Gynecological Ultrasonography:   Uterus: retroverted  Size: 8.58 x 5.81 x 4.77cm  Findings: Mildly enlarged uterus with 2D, 3D and Color Doppler   characteristics for mild adenomyosis without myoma(s)   Endometrium: Thickness total 4.13mm  Findings: Appears normal  Right Ovary: 3.10 x 2.02 x 2.33cm   Left Ovary: 2.86 x 1.75 x 1.69cm  Cul de Sac/Pouch of Reynaldo: no ff      Impression: Adenomyosis.     SONOGRAPHER NOTES TO READING PROVIDER:   Mild adenomyosis. Despite OCP   therapy bilateral ovaries appear multi-follicular.                Discussed here       RECENT LABS:   ECG:   ECHO:

## 2018-09-04 NOTE — PROGRESS NOTES
Admission medication history interview status for the 9/4/2018  admission is complete. See EPIC admission navigator for prior to admission medications     Medication history source reliability:Good    Medication history interview source(s):Patient    Medication history resources (including written lists, pill bottles, clinic record):Pt mailed in medication list prior to surgery.    Primary pharmacy.[facility] ([place])    Additional medication history information not noted on PTA med list :Pt brought Systane.    Time spent in this activity: 40 minutes    Prior to Admission medications    Medication Sig Last Dose Taking? Auth Provider   acyclovir (ZOVIRAX) 400 MG tablet Take 400 mg by mouth daily as needed (Cold sores)  8/31/2018 at 1200 Yes Reported, Patient   Aspirin-Acetaminophen-Caffeine (EXCEDRIN MIGRAINE PO) Take 1-2 tablets by mouth 2 times daily as needed (headaches) more than 3 weeks at prn Yes Reported, Patient   Ibuprofen (ADVIL PO) Take 400-600 mg by mouth 2 times daily as needed for moderate pain (Headache)  more than a week at prn Yes Reported, Patient   Ketotifen Fumarate (ZADITOR OP) Place 1 drop into both eyes daily as needed more than a week at prn Yes Reported, Patient   norgestimate-ethinyl estradiol (ORTHO-CYCLEN, SPRINTEC) 0.25-35 MG-MCG per tablet Take 1 tablet by mouth At Bedtime  8/29/2018 at pm Yes Reported, Patient   OVER-THE-COUNTER Apply 1 patch topically daily as needed (for energy) Apply for 8-10 hours  (B12 Patch) 8/31/2018 at unknown Yes Reported, Patient   Polyethyl Glycol-Propyl Glycol (SYSTANE OP) Place 1 drop into both eyes At Bedtime Past Week at Unknown time Yes Reported, Patient   spironolactone (ALDACTONE) 100 MG tablet Take 100 mg by mouth At Bedtime  more than a week at pm Yes Reported, Patient

## 2018-09-04 NOTE — IP AVS SNAPSHOT
MRN:[id number]                      After Visit Summary   9/4/2018    Carter Stacy    MRN: [id number]           Thank you!     Thank you for choosing [facility] for your care. Our goal is always to provide you with excellent care. Hearing back from our patients is one way we can continue to improve our services. Please take a few minutes to complete the written survey that you may receive in the mail after you visit with us. Thank you!        Patient Information     Date Of Birth          1975        Designated Caregiver       Most Recent Value    Caregiver    Will someone help with your care after discharge? yes    Name of designated caregiver Evan Lalo     Phone number of caregiver     Caregiver address [address] [facility]      About your hospital stay     You were admitted on:  September 4, 2018 You last received care in the:  [facility] Observation Unit    You were discharged on:  September 5, 2018       Who to Call     For medical emergencies, please call 911.  For non-urgent questions about your medical care, please call your primary care provider or clinic, [phone]  For questions related to your surgery, please call your surgery clinic        Attending Provider     Provider Specialty    Sam Riley MD OB/Gyn       Primary Care Provider Office Phone # Fax #    Graham Beronica Croft [phone]      After Care Instructions     Discharge Instructions       Patient to follow up with appointment in 2 weeks                  Further instructions from your care team             Discharge Instructions for Laparoscopic or Davinci Hysterectomy    Incisional Care  A small amount of drainage from your incisions is normal. You may wear Band Aids until the drainage stops. The day after surgery, if your incisions are dry, remove the Band Aids. Leave the Steri-Strips (small pieces of tape) in place. Let them fall off on their own, or gently remove them after 7  days.  Once your have removed your Band Aids, you may shower as usual. Wash your incisions with mild soap and water. Pat dry.  Don't use oils, powders, or lotions on your incisions.  General Care  Take your medications exactly as directed by your doctor.    You may have vaginal bleeding that can last for several weeks. Use pads only. Don't put anything in your vagina (tampons, douches or have sexual intercourse) until your doctor says it's safe to do so.  Avoid constipation.  Eat fruits, vegetables, and whole grains.  Drink 6-8 glasses of water a day, unless told to do otherwise.  Use a laxative or a mild stool softener if your doctor says it's okay.  Activity  Don't drive while you are still taking narcotic pain medications.  Don t do strenuous activities until the doctor says it's okay.  Walk as often as you feel able.    Pain  Your incisions may be tender or sore. You may also have pain in your upper back or shoulders. This is from the gas used to enlarge your abdomen to allow your doctor to see inside your pelvis and perform the procedure. This pain usually goes away in a day or two.   When to Call Your Doctor  Call your doctor right away if you have any of the following:  Fever above 100.4 F (38 C) or chills  Vaginal bleeding that soaks more than one sanitary pad per hour  A foul smelling discharge from the vagina  Difficulty urinating  Severe pain   Redness, swelling, or drainage at your incision sites  Follow-Up  Make a follow-up appointment as directed by your surgeon.        Same Day Surgery Discharge Instructions for  Sedation and General Anesthesia       It's not unusual to feel dizzy, light-headed or faint for up to 24 hours after surgery or while taking pain medication.  If you have these symptoms: sit for a few minutes before standing and have someone assist you when you get up to walk or use the bathroom.      You should rest and relax for the next 24 hours. We recommend you make arrangements to have  "an adult stay with you for at least 24 hours after your discharge.  Avoid hazardous and strenuous activity.      DO NOT DRIVE any vehicle or operate mechanical equipment for 24 hours following the end of your surgery.  Even though you may feel normal, your reactions may be affected by the medication you have received.      Do not drink alcoholic beverages for 24 hours following surgery.       Slowly progress to your regular diet as you feel able. It's not unusual to feel nauseated and/or vomit after receiving anesthesia.  If you develop these symptoms, drink clear liquids (apple juice, ginger ale, broth, 7-up, etc. ) until you feel better.  If your nausea and vomiting persists for 24 hours, please notify your surgeon.        All narcotic pain medications, along with inactivity and anesthesia, can cause constipation. Drinking plenty of liquids and increasing fiber intake will help.      For any questions of a medical nature, call your surgeon.      Do not make important decisions for 24 hours.      If you had general anesthesia, you may have a sore throat for a couple of days related to the breathing tube used during surgery.  You may use Cepacol lozenges to help with this discomfort.  If it worsens or if you develop a fever, contact your surgeon.       If you feel your pain is not well managed with the pain medications prescribed by your surgeon, please contact your surgeon's office to let them know so they can address your concerns.           **If you have questions or concerns about your procedure,   call Dr. Riley at [phone]**    Pending Results     No orders found for last 3 day(s).            Admission Information     Date & Time Provider Department Dept. Phone    9/4/2018 Sam Riley MD [facility] [phone]      Your Vitals Were     Blood Pressure Pulse Temperature Respirations Height Weight    115/74 (BP Location: Right arm) 58 98.9  F (37.2  C) (Axillary) 18 1.651 m (5' 5\") " "85.1 kg (187 lb 11.2 oz)    Last Period Pulse Oximetry BMI (Body Mass Index)             2018 96% 31.23 kg/m2         [employer] Information     NGRAIN lets you send messages to your doctor, view your test results, renew your prescriptions, schedule appointments and more. To sign up, go to [email or web address] . Click on \"Log in\" on the left side of the screen, which will take you to the Welcome page. Then click on \"Sign up Now\" on the right side of the page.     You will be asked to enter the access code listed below, as well as some personal information. Please follow the directions to create your username and password.     Your access code is: [email or web address]  Expires: 2018 11:09 AM     Your access code will  in 90 days. If you need help or a new code, please call your [facility] clinic or [phone].        Care EveryWhere ID     This is your Care EveryWhere ID. This could be used by other organizations to access your [facility] medical records  [id number]        Equal Access to Services     CYNTHIA CONN AH: Hadashwini Burgos, misael laird, emile mcbride, nhi diaz. So [facility] [phone].    ATENCIÓN: Si habla español, tiene a butcher disposición servicios gratuitos de asistencia lingüística. Priya al [phone].    We comply with applicable federal civil rights laws and [place] laws. We do not discriminate on the basis of race, color, national origin, age, disability, sex, sexual orientation, or gender identity.               Review of your medicines      START taking        Dose / Directions    oxyCODONE-acetaminophen 5-325 MG per tablet   Commonly known as:  PERCOCET        Dose:  1-2 tablet   Take 1-2 tablets by mouth every 4 hours as needed for pain (moderate to severe)   Quantity:  36 tablet   Refills:  0         CONTINUE these medicines which have NOT CHANGED        Dose / Directions    acyclovir 400 MG tablet   Commonly known as:  " ZOVIRAX        Dose:  400 mg   Take 400 mg by mouth daily as needed (Cold sores)   Refills:  0       ADVIL PO        Dose:  400-600 mg   Take 400-600 mg by mouth 2 times daily as needed for moderate pain (Headache)   Refills:  0       EXCEDRIN MIGRAINE PO        Dose:  1-2 tablet   Take 1-2 tablets by mouth 2 times daily as needed (headaches)   Refills:  0       norgestimate-ethinyl estradiol 0.25-35 MG-MCG per tablet   Commonly known as:  ORTHO-CYCLEN, SPRINTEC        Dose:  1 tablet   Take 1 tablet by mouth At Bedtime   Refills:  0       OVER-THE-COUNTER        Dose:  1 patch   Apply 1 patch topically daily as needed (for energy) Apply for 8-10 hours (B12 Patch)   Refills:  0       spironolactone 100 MG tablet   Commonly known as:  ALDACTONE        Dose:  100 mg   Take 100 mg by mouth At Bedtime   Refills:  0       SYSTANE OP        Dose:  1 drop   Place 1 drop into both eyes At Bedtime   Refills:  0       ZADITOR OP        Dose:  1 drop   Place 1 drop into both eyes daily as needed   Refills:  0            Where to get your medicines      Some of these will need a paper prescription and others can be bought over the counter. Ask your nurse if you have questions.     Bring a paper prescription for each of these medications     oxyCODONE-acetaminophen 5-325 MG per tablet                Protect others around you: Learn how to safely use, store and throw away your medicines at www.disposemymeds.org.        Information about OPIOIDS     PRESCRIPTION OPIOIDS: WHAT YOU NEED TO KNOW   We gave you an opioid (narcotic) pain medicine. It is important to manage your pain, but opioids are not always the best choice. You should first try all the other options your care team gave you. Take this medicine for as short a time (and as few doses) as possible.    Some activities can increase your pain, such as bandage changes or therapy sessions. It may help to take your pain medicine 30 to 60 minutes before these activities. Reduce  your stress by getting enough sleep, working on hobbies you enjoy and practicing relaxation or meditation. Talk to your care team about ways to manage your pain beyond prescription opioids.    These medicines have risks:    DO NOT drive when on new or higher doses of pain medicine. These medicines can affect your alertness and reaction times, and you could be arrested for driving under the influence (DUI). If you need to use opioids long-term, talk to your care team about driving.    DO NOT operate heavy machinery    DO NOT do any other dangerous activities while taking these medicines.    DO NOT drink any alcohol while taking these medicines.     If the opioid prescribed includes acetaminophen, DO NOT take with any other medicines that contain acetaminophen. Read all labels carefully. Look for the word  acetaminophen  or  Tylenol.  Ask your pharmacist if you have questions or are unsure.    You can get addicted to pain medicines, especially if you have a history of addiction (chemical, alcohol or substance dependence). Talk to your care team about ways to reduce this risk.    All opioids tend to cause constipation. Drink plenty of water and eat foods that have a lot of fiber, such as fruits, vegetables, prune juice, apple juice and high-fiber cereal. Take a laxative (Miralax, milk of magnesia, Colace, Senna) if you don t move your bowels at least every other day. Other side effects include upset stomach, sleepiness, dizziness, throwing up, tolerance (needing more of the medicine to have the same effect), physical dependence and slowed breathing.    Store your pills in a secure place, locked if possible. We will not replace any lost or stolen medicine. If you don t finish your medicine, please throw away (dispose) as directed by your pharmacist. The [place] [employer] has more information about safe disposal: https://[email or web address].[email or web address]/living-green/managing-unwanted-medications              Medication List: This is a list of all your medications and when to take them. Check marks below indicate your daily home schedule. Keep this list as a reference.      Medications           Morning Afternoon Evening Bedtime As Needed    acyclovir 400 MG tablet   Commonly known as:  ZOVIRAX   Take 400 mg by mouth daily as needed (Cold sores)                                ADVIL PO   Take 400-600 mg by mouth 2 times daily as needed for moderate pain (Headache)                                EXCEDRIN MIGRAINE PO   Take 1-2 tablets by mouth 2 times daily as needed (headaches)                                norgestimate-ethinyl estradiol 0.25-35 MG-MCG per tablet   Commonly known as:  ORTHO-CYCLEN, SPRINTEC   Take 1 tablet by mouth At Bedtime                                OVER-THE-COUNTER   Apply 1 patch topically daily as needed (for energy) Apply for 8-10 hours (B12 Patch)                                oxyCODONE-acetaminophen 5-325 MG per tablet   Commonly known as:  PERCOCET   Take 1-2 tablets by mouth every 4 hours as needed for pain (moderate to severe)   Last time this was given:  2 tablets on 9/5/2018 11:46 AM                                spironolactone 100 MG tablet   Commonly known as:  ALDACTONE   Take 100 mg by mouth At Bedtime                                SYSTANE OP   Place 1 drop into both eyes At Bedtime                                ZADITOR OP   Place 1 drop into both eyes daily as needed

## 2018-09-04 NOTE — DISCHARGE INSTRUCTIONS
Discharge Instructions for Laparoscopic or Davinci Hysterectomy    Incisional Care  A small amount of drainage from your incisions is normal. You may wear Band Aids until the drainage stops. The day after surgery, if your incisions are dry, remove the Band Aids. Leave the Steri-Strips (small pieces of tape) in place. Let them fall off on their own, or gently remove them after 7 days.  Once your have removed your Band Aids, you may shower as usual. Wash your incisions with mild soap and water. Pat dry.  Don't use oils, powders, or lotions on your incisions.  General Care  Take your medications exactly as directed by your doctor.    You may have vaginal bleeding that can last for several weeks. Use pads only. Don't put anything in your vagina (tampons, douches or have sexual intercourse) until your doctor says it's safe to do so.  Avoid constipation.  Eat fruits, vegetables, and whole grains.  Drink 6-8 glasses of water a day, unless told to do otherwise.  Use a laxative or a mild stool softener if your doctor says it's okay.  Activity  Don't drive while you are still taking narcotic pain medications.  Don t do strenuous activities until the doctor says it's okay.  Walk as often as you feel able.    Pain  Your incisions may be tender or sore. You may also have pain in your upper back or shoulders. This is from the gas used to enlarge your abdomen to allow your doctor to see inside your pelvis and perform the procedure. This pain usually goes away in a day or two.   When to Call Your Doctor  Call your doctor right away if you have any of the following:  Fever above 100.4 F (38 C) or chills  Vaginal bleeding that soaks more than one sanitary pad per hour  A foul smelling discharge from the vagina  Difficulty urinating  Severe pain   Redness, swelling, or drainage at your incision sites  Follow-Up  Make a follow-up appointment as directed by your surgeon.        Same Day Surgery Discharge Instructions for  Sedation  and General Anesthesia       It's not unusual to feel dizzy, light-headed or faint for up to 24 hours after surgery or while taking pain medication.  If you have these symptoms: sit for a few minutes before standing and have someone assist you when you get up to walk or use the bathroom.      You should rest and relax for the next 24 hours. We recommend you make arrangements to have an adult stay with you for at least 24 hours after your discharge.  Avoid hazardous and strenuous activity.      DO NOT DRIVE any vehicle or operate mechanical equipment for 24 hours following the end of your surgery.  Even though you may feel normal, your reactions may be affected by the medication you have received.      Do not drink alcoholic beverages for 24 hours following surgery.       Slowly progress to your regular diet as you feel able. It's not unusual to feel nauseated and/or vomit after receiving anesthesia.  If you develop these symptoms, drink clear liquids (apple juice, ginger ale, broth, 7-up, etc. ) until you feel better.  If your nausea and vomiting persists for 24 hours, please notify your surgeon.        All narcotic pain medications, along with inactivity and anesthesia, can cause constipation. Drinking plenty of liquids and increasing fiber intake will help.      For any questions of a medical nature, call your surgeon.      Do not make important decisions for 24 hours.      If you had general anesthesia, you may have a sore throat for a couple of days related to the breathing tube used during surgery.  You may use Cepacol lozenges to help with this discomfort.  If it worsens or if you develop a fever, contact your surgeon.       If you feel your pain is not well managed with the pain medications prescribed by your surgeon, please contact your surgeon's office to let them know so they can address your concerns.           **If you have questions or concerns about your procedure,   call Dr. Riley at [phone]**

## 2018-09-04 NOTE — OP NOTE
Procedure Date: 09/04/2018      DATE OF SURGERY:  09/04/2018.      REASON FOR ADMISSION:  Adenomyosis.      OPERATIVE PROCEDURE:  Laparoscopic-assisted supracervical hysterectomy, bilateral salpingectomy.      OPERATIVE FINDINGS:  The patient had a large boggy uterus with normal-appearing ovaries at the end of the case.  The entire pelvis was hemostatic.  Upper abdomen exploration was unremarkable.      OPERATIVE PROCEDURE IN DETAIL:  After general anesthesia was induced, the patient was placed in the dorsal lithotomy position and prepped and draped in the usual fashion.  A Haney catheter was placed.  A uterine manipulator was placed.      Through a subumbilical incision, the Veress needle was placed and 3 liters of CO2 were insufflated.  The laparoscope, trocar and sheath were placed without incident.  Two 5 mm lower quadrant trocars were placed under direct vision without complication.  The mesosalpinx, round ligament, broad ligament and uterine artery pedicles were identified, doubly cauterized and cut on both sides.  The bladder peritoneum attachment to the cervix was incised.  At the mid plane of the cervix, the uterus was excised with a plasma scalpel.  The endocervical canal was cauterized from above.  Hemostasis was excellent.      The left lower quadrant trocar site was expanded to admit the morcellator.  The entire uterus and both tubes were morcellated without difficulty.  Copious irrigation was undertaken.  After hemostasis was assured, the left lower quadrant trocar site was closed at the peritoneum and fascia with 2-0 Vicryl.  Desufflation and reinspection of all surgical sites under low pressure and water showed no sign of bleeding.  The skin incisions were closed with 4-0 Vicryl.  The estimated blood loss for the case was 25 mL.  The patient tolerated this well.  She went to the recovery room in satisfactory condition and will be observed overnight.         LEEANN VANN JR, MD             D:  2018   T: 2018   MT: CECILIA      Name:     HUNTER DENNIS   MRN:      [id number]        Account:        [id number]   :      1975           Procedure Date: 2018      Document: [id number]

## 2018-09-04 NOTE — ANESTHESIA POSTPROCEDURE EVALUATION
Patient: Crater Stacy    Procedure(s):  LAPAROSCOPIC ASSISTED SUPRACERVICAL HYSTERECTOMY, BILATERAL SALPINGECTOMY - Wound Class: II-Clean Contaminated   - Wound Class: II-Clean Contaminated    Diagnosis:adenomyosis  Diagnosis Additional Information: No value filed.    Anesthesia Type:  General, ETT    Note:  Anesthesia Post Evaluation    Patient location during evaluation: PACU  Patient participation: Able to fully participate in evaluation  Level of consciousness: awake  Pain management: adequate  Airway patency: patent  Cardiovascular status: acceptable  Respiratory status: acceptable  Hydration status: acceptable  PONV: controlled     Anesthetic complications: None          Last vitals:  Vitals:    09/04/18 1227 09/04/18 1339 09/04/18 1512   BP: 118/78 134/78 111/70   Pulse:      Resp: 23 21 19   Temp:  36.3  C (97.3  F) 36.6  C (97.9  F)   SpO2: 98% 97% 96%         Electronically Signed By: Shira Zapata MD  September 4, 2018  3:19 PM

## 2018-09-05 VITALS
DIASTOLIC BLOOD PRESSURE: 74 MMHG | HEART RATE: 58 BPM | SYSTOLIC BLOOD PRESSURE: 115 MMHG | OXYGEN SATURATION: 96 % | RESPIRATION RATE: 18 BRPM | WEIGHT: 187.7 LBS | HEIGHT: 65 IN | BODY MASS INDEX: 31.27 KG/M2 | TEMPERATURE: 98.9 F

## 2018-09-05 LAB
COPATH REPORT: NORMAL
GLUCOSE SERPL-MCNC: 90 MG/DL (ref 70–99)
HGB BLD-MCNC: 11.5 G/DL (ref 11.7–15.7)

## 2018-09-05 PROCEDURE — 82947 ASSAY GLUCOSE BLOOD QUANT: CPT | Performed by: OBSTETRICS & GYNECOLOGY

## 2018-09-05 PROCEDURE — 40000934 ZZH STATISTIC OUTPATIENT (NON-OBS) DAY

## 2018-09-05 PROCEDURE — 85018 HEMOGLOBIN: CPT | Performed by: OBSTETRICS & GYNECOLOGY

## 2018-09-05 PROCEDURE — 40000935 ZZH STATISTIC OUTPATIENT (NON-OBS) EVE

## 2018-09-05 PROCEDURE — 36415 COLL VENOUS BLD VENIPUNCTURE: CPT | Performed by: OBSTETRICS & GYNECOLOGY

## 2018-09-05 PROCEDURE — 25000132 ZZH RX MED GY IP 250 OP 250 PS 637: Performed by: OBSTETRICS & GYNECOLOGY

## 2018-09-05 RX ADMIN — OXYCODONE HYDROCHLORIDE AND ACETAMINOPHEN 2 TABLET: 5; 325 TABLET ORAL at 11:46

## 2018-09-05 RX ADMIN — OXYCODONE HYDROCHLORIDE AND ACETAMINOPHEN 2 TABLET: 5; 325 TABLET ORAL at 00:19

## 2018-09-05 RX ADMIN — OXYCODONE HYDROCHLORIDE AND ACETAMINOPHEN 2 TABLET: 5; 325 TABLET ORAL at 05:43

## 2018-09-05 NOTE — PLAN OF CARE
Problem: Patient Care Overview  Goal: Plan of Care/Patient Progress Review  Pt A&O. VSS on RA. CMS intact. Up with A x1. Dressing CDI. LS clear. BS+, no gas. Haney dc, due to void. Advanced diet to regular. Pain controlled with percocet, and ice. Discharge today.

## 2018-09-05 NOTE — PLAN OF CARE
Problem: Patient Care Overview  Goal: Plan of Care/Patient Progress Review  Outcome: Improving  A/O, VSS, SBA, abd steri strips CDI, hypoactive BS, no flatus. No vaginal packing or spotting. Haney WDL to discontinue AM. Ice to abd. Percocet for pain. IS 2000 done independently. Discharge pending 9/5.

## 2018-09-05 NOTE — PLAN OF CARE
Problem: Patient Care Overview  Goal: Plan of Care/Patient Progress Review  Outcome: Adequate for Discharge Date Met: 09/05/18  Assumed care at 1500. Pt is a/o. VSS. Pain controlled. Tolerating diet. Up adlib. Voided in good amounts. Lap site CDI. Luis Antonio paper work reviewed with pt. Verbalizes understanding. PIV removed. Take home meds given. Verified 5 rights of med. Follow up aware. All questions answered. Dc home with .

## 2018-09-05 NOTE — PLAN OF CARE
A&Ox4, VSS on RA. Capno WDL. C/o pain managed by PRN percocet. 3 lap Incisions CDI, bowel sounds active. Up ad edda. Reg diet, voided 150 since mckinney d/c'd. Experiencing some retention; was straight cath'd for 743. Encouraging ambulation. IV SL. Plan to DC this afternoon.

## 2018-09-19 ENCOUNTER — OFFICE VISIT (OUTPATIENT)
Dept: OBGYN | Facility: CLINIC | Age: 43
End: 2018-09-19
Payer: COMMERCIAL

## 2018-09-19 VITALS — BODY MASS INDEX: 30.29 KG/M2 | DIASTOLIC BLOOD PRESSURE: 80 MMHG | WEIGHT: 182 LBS | SYSTOLIC BLOOD PRESSURE: 120 MMHG

## 2018-09-19 DIAGNOSIS — Z09 SURGICAL FOLLOW-UP CARE: Primary | ICD-10-CM

## 2018-09-19 LAB — HGB BLD-MCNC: 12.5 G/DL (ref 11.7–15.7)

## 2018-09-19 PROCEDURE — 85018 HEMOGLOBIN: CPT | Performed by: OBSTETRICS & GYNECOLOGY

## 2018-09-19 PROCEDURE — 36415 COLL VENOUS BLD VENIPUNCTURE: CPT | Performed by: OBSTETRICS & GYNECOLOGY

## 2018-09-19 PROCEDURE — 99024 POSTOP FOLLOW-UP VISIT: CPT | Performed by: OBSTETRICS & GYNECOLOGY

## 2018-09-19 NOTE — PROGRESS NOTES
The patient is seen for her postoperative check.  Her incisions of healed well.  Her pathology was benign.  Her postop hemoglobin is 11.5 g percent.  She will slowly increase activity and return to see us in 2 months per

## 2018-09-19 NOTE — MR AVS SNAPSHOT
After Visit Summary   9/19/2018    Carter Stacy    MRN: [id number]           Patient Information     Date Of Birth          1975        Visit Information        Provider Department      9/19/2018 3:45 PM Sam Riley MD [facility]        Today's Diagnoses     Surgical follow-up care    -  1       Follow-ups after your visit        Who to contact     If you have questions or need follow up information about today's clinic visit or your schedule please contact [facility] directly at [phone].  Normal or non-critical lab and imaging results will be communicated to you by profectus health researchhart, letter or phone within 4 business days after the clinic has received the results. If you do not hear from us within 7 days, please contact the clinic through imeemt or phone. If you have a critical or abnormal lab result, we will notify you by phone as soon as possible.  Submit refill requests through [employer] or call your pharmacy and they will forward the refill request to us. Please allow 3 business days for your refill to be completed.          Additional Information About Your Visit        MyChart Information     [employer] gives you secure access to your electronic health record. If you see a primary care provider, you can also send messages to your care team and make appointments. If you have questions, please call your primary care clinic.  If you do not have a primary care provider, please call [phone] and they will assist you.        Care EveryWhere ID     This is your Care EveryWhere ID. This could be used by other organizations to access your [facility] medical records  [id number]        Your Vitals Were     Last Period Breastfeeding? BMI (Body Mass Index)             08/28/2018 No 30.29 kg/m2          Blood Pressure from Last 3 Encounters:   09/19/18 120/80   09/05/18 115/74   08/07/18 124/72    Weight from Last 3 Encounters:   09/19/18 182 lb (82.6 kg)    09/04/18 187 lb 11.2 oz (85.1 kg)   08/07/18 181 lb (82.1 kg)              We Performed the Following     Hemoglobin        Primary Care Provider Office Phone # Fax #    Graham Beronica Croft [phone]       [facility] [address] [facility] [address]        Equal Access to Services     CYNTHIA CONN : Hadii aad ku hadasho Soomaali, waaxda luqadaha, qaybta kaalmada adeegyada, waxay idiin hayaan adeeg yenifernagivasquez lajebn . So [facility] [phone].    ATENCIÓN: Si habla español, tiene a butcher disposición servicios gratuitos de asistencia lingüística. Rozinaame al [phone].    We comply with applicable federal civil rights laws and [place] laws. We do not discriminate on the basis of race, color, national origin, age, disability, sex, sexual orientation, or gender identity.            Thank you!     Thank you for choosing [facility] [place]  for your care. Our goal is always to provide you with excellent care. Hearing back from our patients is one way we can continue to improve our services. Please take a few minutes to complete the written survey that you may receive in the mail after your visit with us. Thank you!             Your Updated Medication List - Protect others around you: Learn how to safely use, store and throw away your medicines at www.disposemymeds.org.          This list is accurate as of 9/19/18  4:14 PM.  Always use your most recent med list.                   Brand Name Dispense Instructions for use Diagnosis    acyclovir 400 MG tablet    ZOVIRAX     Take 400 mg by mouth daily as needed (Cold sores)        ADVIL PO      Take 400-600 mg by mouth 2 times daily as needed for moderate pain (Headache)        EXCEDRIN MIGRAINE PO      Take 1-2 tablets by mouth 2 times daily as needed (headaches)        norgestimate-ethinyl estradiol 0.25-35 MG-MCG per tablet    ORTHO-CYCLEN, SPRINTEC     Take 1 tablet by mouth At Bedtime        OVER-THE-COUNTER      Apply 1 patch  topically daily as needed (for energy) Apply for 8-10 hours (B12 Patch)        oxyCODONE-acetaminophen 5-325 MG per tablet    PERCOCET    36 tablet    Take 1-2 tablets by mouth every 4 hours as needed for pain (moderate to severe)    Adenomyosis       spironolactone 100 MG tablet    ALDACTONE     Take 100 mg by mouth At Bedtime        SYSTANE OP      Place 1 drop into both eyes At Bedtime        ZADITOR OP      Place 1 drop into both eyes daily as needed

## 2018-09-19 NOTE — PROGRESS NOTES
"    SUBJECTIVE:                                                   Carter Stacy is a 42 year old female who presents to clinic today for the following health issue(s):  Patient presents with:  Surgical Followup: 9/4/18 [facility]      Additional information: ***    HPI:  ***    Patient's last menstrual period was 08/28/2018..   Patient {is/is not:850697::\"is\"} sexually active, No obstetric history on file..  Using {[facility] CONTRACEPTION:528350} for contraception.    reports that she has quit smoking. She has never used smokeless tobacco.  {Tobacco Cessation -- Delete if patient is a non-smoker:032419}  STD testing offered?  {PLC GC/CHLAMYDIA:206633}    Health maintenance updated:  {yes no:582327}    Today's PHQ-2 Score: No flowsheet data found.  Today's PHQ-9 Score:   PHQ-9 SCORE 8/7/2018   Total Score 7     Today's TOBY-7 Score:   TOBY-7 SCORE 8/7/2018   Total Score 3       Problem list and histories reviewed & adjusted, as indicated.  Additional history: as documented.    Patient Active Problem List   Diagnosis     Adenomyosis     Past Surgical History:   Procedure Laterality Date     ------------OTHER-------------  2008    Exploratory for endo     GYN SURGERY      Laparoscopy     HC TOOTH EXTRACTION W/FORCEP       LAPAROSCOPIC HYSTERECTOMY SUPRACERVICAL N/A 9/4/2018    Procedure: LAPAROSCOPIC HYSTERECTOMY SUPRACERVICAL;  LAPAROSCOPIC ASSISTED SUPRACERVICAL HYSTERECTOMY, BILATERAL SALPINGECTOMY;  Surgeon: Sam Riley MD;  Location: [facility]     LAPAROSCOPIC SALPINGECTOMY Bilateral 9/4/2018    Procedure: LAPAROSCOPIC SALPINGECTOMY;;  Surgeon: Sam Riley MD;  Location: [facility]      Social History   Substance Use Topics     Smoking status: Former Smoker     Smokeless tobacco: Never Used     Alcohol use Yes      Comment: 1/ week       Problem (# of Occurrences) Relation (Name,Age of Onset)    Colon Cancer (1) Paternal Uncle    Diabetes (2) Maternal Uncle, Maternal Uncle    Hyperlipidemia (1) Father (45)    " "Hypertension (1) Father (45)            Current Outpatient Prescriptions   Medication Sig     acyclovir (ZOVIRAX) 400 MG tablet Take 400 mg by mouth daily as needed (Cold sores)      Aspirin-Acetaminophen-Caffeine (EXCEDRIN MIGRAINE PO) Take 1-2 tablets by mouth 2 times daily as needed (headaches)     Ibuprofen (ADVIL PO) Take 400-600 mg by mouth 2 times daily as needed for moderate pain (Headache)      Ketotifen Fumarate (ZADITOR OP) Place 1 drop into both eyes daily as needed     norgestimate-ethinyl estradiol (ORTHO-CYCLEN, SPRINTEC) 0.25-35 MG-MCG per tablet Take 1 tablet by mouth At Bedtime      OVER-THE-COUNTER Apply 1 patch topically daily as needed (for energy) Apply for 8-10 hours  (B12 Patch)     oxyCODONE-acetaminophen (PERCOCET) 5-325 MG per tablet Take 1-2 tablets by mouth every 4 hours as needed for pain (moderate to severe)     Polyethyl Glycol-Propyl Glycol (SYSTANE OP) Place 1 drop into both eyes At Bedtime     spironolactone (ALDACTONE) 100 MG tablet Take 100 mg by mouth At Bedtime      No current facility-administered medications for this visit.      Allergies   Allergen Reactions     Sertraline Rash       ROS:  {[facility] ROSGYN:082748::\"12 point review of systems negative other than symptoms noted below.\"}    OBJECTIVE:     LMP 08/28/2018  There is no height or weight on file to calculate BMI.    Exam:  {[facility] EXAM CHOICES:567276}     In-Clinic Test Results:  No results found for this or any previous visit (from the past 24 hour(s)).    ASSESSMENT/PLAN:                                                      No diagnosis found.    There are no Patient Instructions on file for this visit.    ***    Sam Riley MD  [facility]  "

## 2018-09-24 ENCOUNTER — TELEPHONE (OUTPATIENT)
Dept: NURSING | Facility: CLINIC | Age: 43
End: 2018-09-24

## 2018-09-24 NOTE — TELEPHONE ENCOUNTER
9/4/18 Surgery with EB OPERATIVE PROCEDURE:  Laparoscopic-assisted supracervical hysterectomy, bilateral salpingectomy.  Post op visit 9/19/18 and he reassured pt pains she is experiencing are more gas related    She is continuing to experience this pain-low cramping more in the morning. Relieved with ibuprofen and gas-x OTC  Having BM's with use of senna and laxative over the weekend and is passing flatus but not taking senna or stool softener regularly. Is taking simethicone daily and is helpful with the gas pains.   Recommended to increase fluids, fruits/vegetables, slowly increase activity as tolerated and recommended.  Recommended taking daily stool softener/senna or something to keep her regular as she feels so much better after BM's.   Call with any concerns or questions. She is to f/u with EB in 2 months.  Pt verbalized understanding and no further questions.

## 2019-11-05 ENCOUNTER — HEALTH MAINTENANCE LETTER (OUTPATIENT)
Age: 44
End: 2019-11-05

## 2020-11-22 ENCOUNTER — HEALTH MAINTENANCE LETTER (OUTPATIENT)
Age: 45
End: 2020-11-22

## 2021-09-19 ENCOUNTER — HEALTH MAINTENANCE LETTER (OUTPATIENT)
Age: 46
End: 2021-09-19

## 2021-11-14 ENCOUNTER — HEALTH MAINTENANCE LETTER (OUTPATIENT)
Age: 46
End: 2021-11-14

## 2022-01-09 ENCOUNTER — HEALTH MAINTENANCE LETTER (OUTPATIENT)
Age: 47
End: 2022-01-09

## 2022-11-21 ENCOUNTER — HEALTH MAINTENANCE LETTER (OUTPATIENT)
Age: 47
End: 2022-11-21

## 2023-04-16 ENCOUNTER — HEALTH MAINTENANCE LETTER (OUTPATIENT)
Age: 48
End: 2023-04-16

## 2023-11-25 ENCOUNTER — HEALTH MAINTENANCE LETTER (OUTPATIENT)
Age: 48
End: 2023-11-25

## (undated) DEVICE — SOL RINGERS LACTATED 1000ML BAG 2B2324X

## (undated) DEVICE — ESU PENCIL W/HOLSTER E2350H

## (undated) DEVICE — PREP SKIN SCRUB TRAY 4461A

## (undated) DEVICE — SU VICRYL 2-0 UR-6 27" J602H

## (undated) DEVICE — NDL INSUFFLATION 13GA 120MM C2201

## (undated) DEVICE — ESU HOLDER LAP INST DISP PURPLE LONG 330MM H-PRO-330

## (undated) DEVICE — CATH TRAY FOLEY SURESTEP 16FR WDRAIN BAG STLK LATEX A300316A

## (undated) DEVICE — GLOVE PROTEXIS W/NEU-THERA 8.0  2D73TE80

## (undated) DEVICE — DEVICE SUTURE GRASPER TROCAR CLOSURE 14GA PMITCSG

## (undated) DEVICE — MORCELLATOR LAPAROSCOPIC LINA XCISE MOR-1515-6

## (undated) DEVICE — SYR 05ML SLIP TIP W/O NDL 309647

## (undated) DEVICE — SU VICRYL 2-0 TIE 12X18" J905T

## (undated) DEVICE — ESU HANDPIECE OLYMPUS PK SPATULA PK-SP0533

## (undated) DEVICE — GLOVE PROTEXIS MICRO 7.5  2D73PM75

## (undated) DEVICE — TUBING HYDRO-SURG PLUS LAP IRRIGATOR SUCTION 0026870

## (undated) DEVICE — LINEN TOWEL PACK X5 5464

## (undated) DEVICE — ESU ELEC BLADE 6" COATED E1450-6

## (undated) DEVICE — ESU FCP OLYMPUS PK CUTTING 5MMX33CM PK-CF0533

## (undated) DEVICE — Device

## (undated) DEVICE — SOL WATER IRRIG 1000ML BOTTLE 2F7114

## (undated) DEVICE — WIPES FOLEY CARE SURESTEP PROVON DFC100

## (undated) DEVICE — SUCTION CANISTER MEDIVAC LINER 3000ML W/LID 65651-530

## (undated) DEVICE — SU VICRYL 4-0 PS-2 18" UND J496H

## (undated) DEVICE — ENDO APPLICATOR SURGIFLO PLASMA COBLATION MS1995

## (undated) DEVICE — NDL 19GA 1.5"

## (undated) DEVICE — DRSG STERI STRIP 1/2X4" R1547

## (undated) RX ORDER — SCOLOPAMINE TRANSDERMAL SYSTEM 1 MG/1
PATCH, EXTENDED RELEASE TRANSDERMAL
Status: DISPENSED
Start: 2018-09-04

## (undated) RX ORDER — PROPOFOL 10 MG/ML
INJECTION, EMULSION INTRAVENOUS
Status: DISPENSED
Start: 2018-09-04

## (undated) RX ORDER — DEXAMETHASONE SODIUM PHOSPHATE 4 MG/ML
INJECTION, SOLUTION INTRA-ARTICULAR; INTRALESIONAL; INTRAMUSCULAR; INTRAVENOUS; SOFT TISSUE
Status: DISPENSED
Start: 2018-09-04

## (undated) RX ORDER — ONDANSETRON 2 MG/ML
INJECTION INTRAMUSCULAR; INTRAVENOUS
Status: DISPENSED
Start: 2018-09-04

## (undated) RX ORDER — CEFAZOLIN SODIUM 2 G/100ML
INJECTION, SOLUTION INTRAVENOUS
Status: DISPENSED
Start: 2018-09-04

## (undated) RX ORDER — FENTANYL CITRATE 50 UG/ML
INJECTION, SOLUTION INTRAMUSCULAR; INTRAVENOUS
Status: DISPENSED
Start: 2018-09-04

## (undated) RX ORDER — HYDROMORPHONE HYDROCHLORIDE 1 MG/ML
INJECTION, SOLUTION INTRAMUSCULAR; INTRAVENOUS; SUBCUTANEOUS
Status: DISPENSED
Start: 2018-09-04